# Patient Record
Sex: FEMALE | Race: WHITE | NOT HISPANIC OR LATINO | Employment: FULL TIME | URBAN - METROPOLITAN AREA
[De-identification: names, ages, dates, MRNs, and addresses within clinical notes are randomized per-mention and may not be internally consistent; named-entity substitution may affect disease eponyms.]

---

## 2023-07-28 ENCOUNTER — OFFICE VISIT (OUTPATIENT)
Dept: PRIMARY CARE | Facility: CLINIC | Age: 33
End: 2023-07-28
Payer: MEDICAID

## 2023-07-28 VITALS
TEMPERATURE: 97.6 F | OXYGEN SATURATION: 100 % | SYSTOLIC BLOOD PRESSURE: 112 MMHG | BODY MASS INDEX: 22.13 KG/M2 | WEIGHT: 121 LBS | HEART RATE: 80 BPM | DIASTOLIC BLOOD PRESSURE: 70 MMHG

## 2023-07-28 DIAGNOSIS — K31.84 GASTROPARESIS: Primary | ICD-10-CM

## 2023-07-28 DIAGNOSIS — F41.9 ANXIETY: ICD-10-CM

## 2023-07-28 DIAGNOSIS — H69.93 EUSTACHIAN TUBE DYSFUNCTION, BILATERAL: ICD-10-CM

## 2023-07-28 DIAGNOSIS — R11.0 NAUSEA: ICD-10-CM

## 2023-07-28 PROBLEM — F31.9 BIPOLAR DEPRESSION (MULTI): Status: ACTIVE | Noted: 2023-07-28

## 2023-07-28 PROBLEM — K59.09 CONSTIPATION, CHRONIC: Status: ACTIVE | Noted: 2023-07-28

## 2023-07-28 PROBLEM — R46.81 OBSESSIVE-COMPULSIVE BEHAVIOR: Status: ACTIVE | Noted: 2023-07-28

## 2023-07-28 PROBLEM — F12.90 MARIJUANA USE: Status: ACTIVE | Noted: 2023-07-28

## 2023-07-28 PROBLEM — F43.10 PTSD (POST-TRAUMATIC STRESS DISORDER): Status: ACTIVE | Noted: 2023-07-28

## 2023-07-28 PROBLEM — R45.4 EXCESSIVE ANGER: Status: ACTIVE | Noted: 2023-07-28

## 2023-07-28 PROBLEM — R87.610 ASCUS WITH POSITIVE HIGH RISK HPV CERVICAL: Status: ACTIVE | Noted: 2023-07-28

## 2023-07-28 PROBLEM — R87.810 ASCUS WITH POSITIVE HIGH RISK HPV CERVICAL: Status: ACTIVE | Noted: 2023-07-28

## 2023-07-28 PROBLEM — R45.4 IRRITABILITY AND ANGER: Status: ACTIVE | Noted: 2023-07-28

## 2023-07-28 PROBLEM — R45.5 HOSTILE BEHAVIOR: Status: ACTIVE | Noted: 2023-07-28

## 2023-07-28 PROCEDURE — 99214 OFFICE O/P EST MOD 30 MIN: CPT | Performed by: FAMILY MEDICINE

## 2023-07-28 PROCEDURE — 1036F TOBACCO NON-USER: CPT | Performed by: FAMILY MEDICINE

## 2023-07-28 RX ORDER — NORELGESTROMIN AND ETHINYL ESTRADIOL 35; 150 UG/D; UG/D
1 PATCH TRANSDERMAL
COMMUNITY

## 2023-07-28 RX ORDER — FLUTICASONE PROPIONATE 50 MCG
1 SPRAY, SUSPENSION (ML) NASAL DAILY
Qty: 16 G | Refills: 5 | Status: SHIPPED | OUTPATIENT
Start: 2023-07-28 | End: 2024-07-27

## 2023-07-28 ASSESSMENT — ENCOUNTER SYMPTOMS
VOMITING: 0
TREMORS: 0
DYSURIA: 0
ABDOMINAL PAIN: 0
NAUSEA: 0
COUGH: 0
DIZZINESS: 0
CHILLS: 0
DIARRHEA: 0
WEAKNESS: 0
SHORTNESS OF BREATH: 0
FEVER: 0

## 2023-07-28 NOTE — PROGRESS NOTES
"Subjective   Patient ID: Ale Tucker is a 33 y.o. female who presents for jittery (After eating, never thirsty x \"a while\"   Discuss DM testing).    Ale presents to discuss several concerns.     She is worried she may have diabetes because she always gets jittery. Sometimes eating helps, but sometimes it doesn't. Feels shaky inside. Symptoms p[resents daily.     Also has had flare of gastroparesis. Nauseated. One episode of vomiting.    Having pressure in both ears. No drainage. No change in hearing.             Review of Systems   Constitutional:  Negative for chills and fever.   Respiratory:  Negative for cough and shortness of breath.    Cardiovascular:  Negative for chest pain.   Gastrointestinal:  Negative for abdominal pain, diarrhea, nausea and vomiting.   Genitourinary:  Negative for dysuria.   Neurological:  Negative for dizziness, tremors, syncope and weakness.       Objective   /70   Pulse 80   Temp 36.4 °C (97.6 °F)   Wt 54.9 kg (121 lb)   SpO2 100%   BMI 22.13 kg/m²     Physical Exam  Constitutional:       General: She is not in acute distress.     Appearance: Normal appearance.   HENT:      Head: Normocephalic.      Nose: No congestion.      Mouth/Throat:      Mouth: Mucous membranes are moist.   Eyes:      Extraocular Movements: Extraocular movements intact.      Conjunctiva/sclera: Conjunctivae normal.   Cardiovascular:      Rate and Rhythm: Normal rate and regular rhythm.      Heart sounds: No murmur heard.  Pulmonary:      Effort: Pulmonary effort is normal.      Breath sounds: No wheezing or rhonchi.   Musculoskeletal:         General: No swelling or tenderness.      Cervical back: Neck supple.   Skin:     General: Skin is warm and dry.   Neurological:      General: No focal deficit present.      Mental Status: She is alert.   Psychiatric:         Mood and Affect: Mood normal.         Behavior: Behavior normal.         Assessment/Plan   Problem List Items Addressed This Visit  "      Anxiety    Gastroparesis - Primary    Relevant Orders    Comprehensive Metabolic Panel    CBC and Auto Differential     Other Visit Diagnoses       Nausea        Check labs. Plan GI referral if not improving.    Relevant Orders    Hemoglobin A1C    TSH with reflex to Free T4 if abnormal    Eustachian tube dysfunction, bilateral        Start Flonase    Relevant Medications    fluticasone (Flonase) 50 mcg/actuation nasal spray

## 2023-08-08 ENCOUNTER — TELEMEDICINE (OUTPATIENT)
Dept: PRIMARY CARE | Facility: CLINIC | Age: 33
End: 2023-08-08
Payer: MEDICAID

## 2023-08-08 DIAGNOSIS — J06.9 VIRAL UPPER RESPIRATORY TRACT INFECTION: Primary | ICD-10-CM

## 2023-08-08 PROCEDURE — 99214 OFFICE O/P EST MOD 30 MIN: CPT | Performed by: FAMILY MEDICINE

## 2023-08-08 RX ORDER — BENZONATATE 100 MG/1
100 CAPSULE ORAL 3 TIMES DAILY PRN
Qty: 30 CAPSULE | Refills: 0 | Status: SHIPPED | OUTPATIENT
Start: 2023-08-08 | End: 2023-09-07

## 2023-08-08 ASSESSMENT — ENCOUNTER SYMPTOMS
FATIGUE: 1
NAUSEA: 0
VOMITING: 0
SHORTNESS OF BREATH: 0
FEVER: 0
RHINORRHEA: 1
SORE THROAT: 0
COUGH: 1
CHILLS: 1
WHEEZING: 0

## 2023-08-08 NOTE — PROGRESS NOTES
Subjective   Patient ID: Sussy Tucker is a 33 y.o. female who presents for No chief complaint on file..    Visit being conducted via telemedicine.  Patient consents to telemedicine appointment and acknowledges its limitations.    Physician location: [facility]  Patient location: parked car  Length of telemedicine visit: 5:42 minutes      Sussy has been sick since Sunday. Developed cough and congestion. No fevers but felt warm. No ear pain or sore throat. Nose congested but also running. Coughing. No wheezing or shortness of breath. Using Flonase which helps somewhat. Wants a Z-pack prescription. States that she knows this is not COVID.     hroat,          Review of Systems   Constitutional:  Positive for chills and fatigue. Negative for fever.   HENT:  Positive for congestion and rhinorrhea. Negative for ear pain and sore throat.    Respiratory:  Positive for cough. Negative for shortness of breath and wheezing.    Gastrointestinal:  Negative for nausea and vomiting.       Objective   There were no vitals taken for this visit.    Physical Exam  Constitutional:       General: She is not in acute distress.  HENT:      Head: Normocephalic and atraumatic.   Eyes:      Extraocular Movements: Extraocular movements intact.   Pulmonary:      Effort: Pulmonary effort is normal.   Neurological:      Mental Status: She is alert.   Psychiatric:         Mood and Affect: Mood normal.         Assessment/Plan   Diagnoses and all orders for this visit:  Viral upper respiratory tract infection  Comments:  Likely viral infection. Discussed that antibiotics not indicated since only having symptoms for 3 days. Start benzonatate. Call if not improving.  Orders:  -     benzonatate (Tessalon) 100 mg capsule; Take 1 capsule (100 mg) by mouth 3 times a day as needed for cough. Do not crush or chew.

## 2023-09-12 ENCOUNTER — HOSPITAL ENCOUNTER (OUTPATIENT)
Dept: DATA CONVERSION | Facility: HOSPITAL | Age: 33
End: 2023-09-12
Attending: OBSTETRICS & GYNECOLOGY | Admitting: OBSTETRICS & GYNECOLOGY
Payer: MEDICAID

## 2023-09-12 DIAGNOSIS — Z30.2 ENCOUNTER FOR STERILIZATION: ICD-10-CM

## 2023-09-12 DIAGNOSIS — D06.9 CARCINOMA IN SITU OF CERVIX, UNSPECIFIED: ICD-10-CM

## 2023-09-14 LAB — HCG, URINE: NEGATIVE

## 2023-09-20 LAB
COMPLETE PATHOLOGY REPORT: NORMAL
CONVERTED CLINICAL DIAGNOSIS-HISTORY: NORMAL
CONVERTED FINAL DIAGNOSIS: NORMAL
CONVERTED FINAL REPORT PDF LINK TO COPY AND PASTE: NORMAL
CONVERTED GROSS DESCRIPTION: NORMAL

## 2023-09-29 VITALS — HEIGHT: 61 IN | WEIGHT: 117.95 LBS | BODY MASS INDEX: 22.27 KG/M2

## 2023-09-30 NOTE — H&P
History & Physical Reviewed:   Pregnant/Lactating:  ·  Are You Pregnant no   ·  Are You Currently Breastfeeding no     I have reviewed the History and Physical dated:  28-Aug-2023   History and Physical reviewed and relevant findings noted. Patient examined to review pertinent physical  findings.: No significant changes   Home Medications Reviewed: no changes noted   Allergies Reviewed: no changes noted       ERAS (Enhanced Recovery After Surgery):  ·  ERAS Patient: yes   ·  CPM/PAT Utilization: yes   ·  Immunonutrition Recovery Drink Utilization: no   ·  Carbohydrate Supplement Drink Utilization: no     Consent:   COVID-19 Consent:  ·  COVID-19 Risk Consent Surgeon has reviewed key risks related to the risk of trini COVID-19 and if they contract COVID-19 what the risks are.       Electronic Signatures:  Jai Hernandez)  (Signed 12-Sep-2023 08:20)   Authored: History & Physical Reviewed, ERAS, Consent,  Note Completion      Last Updated: 12-Sep-2023 08:20 by Jai Hernandez)

## 2023-10-01 NOTE — OP NOTE
PROCEDURE DETAILS    Preoperative Diagnosis:  Request for sterilization, Z30.2  ASC H on cervical Pap    Postoperative Diagnosis:  Request for sterilization, Z30.2  ASC H on cervical Pap    Surgeon: Jai Hernandez  Resident/Fellow/Other Assistant: Nica Love    Procedure:  1. LAPAROSCOPIC BILATERAL SALPINGECTOMY with Colposcopy and Loop electrosurgical excision    2.  LEEP cone biopsy    Anesthesia: No anesthesiologist associated with this case  Estimated Blood Loss: 0  Findings: Transformation zone visualized, acetowhite on colposcopy  On laparoscopy normal uterus ovaries and tubes  Specimens(s) Collected: yes,  Both tubes  Cervical cone biopsy     Urine Output: 10 mL        Operative Report:   33-year-old female desires permanent sterilization.  Patient with prior history of HEIDI-1, current Pap with ASC H.  Patient poorly tolerant of pelvic exam in  the office and declines office colposcopy elected to proceed with a LEEP cone biopsy.     Patient brought to the operating room and a huddle was performed, she was placed under general ET tube anesthesia.  She was placed in lithotomy position.  A timeout was performed.  An ionized speculum was inserted in the vagina, her cervix was visualized,  acetic acid was applied, and a composite was performed.  Lugol solution was then applied.  Cervix was injected with 10 mL of 1% lidocaine with epinephrine circumferentially around the cervix.  A LEEP cone biopsy was then performed with 2 specimens.  Hemostasis  was noted be good.  Cervix was cauterized and Monsel solution was applied.    She was then prepped and draped in the usual fashion for both an abdominal and a vaginal procedure. A weighted speculum was placed in the vagina.  Her bladder was emptied with a straight catheter. Her cervix was grasped with a tooth tenaculum. A Hulka clamp was inserted into the uterus and the tooth tenaculum was removed. The weighted speculum was removed. Attention was then turned  abdominally.  An intraumbilical incision was cut sharply in the skin. A Veress needle was inserted into the abdominal cavity. Its position was confirmed with saline. CO2 was instilled into the abdominal cavity. The Veress needle was removed. Under direct vision a 5  mm trocar and sheath was inserted. The trocar was removed and the laparoscope was inserted. A 8 mm left lateral incision was cut sharply in the skin and through this a second trocar and sheath was inserted. And a 5 mm right lateral incision was cut sharply  in the skin and through this a third trocar and sheath was inserted. The pelvis was inspected.  Normal uterus ovaries and tubes.  The left salpinx was grasped and using the ligature device the mesosalpinx was incised throughout the length of the salpinx.  Then the salpinx was transected proximally, thus excising the left salpinx. The same procedure was performed on the right tube, first grasping the tube then incising the mesosalpinx throughout the length of the salpinx, and transecting the salpinx proximally  thus excising the right salpinx.  Hemostasis was noted to be good. All instruments were removed. The incisions were repaired with 4-0 Vicryl. All counts were correct. Estimated blood loss was 0 ml.  Patient was taken to the recovery room in stable condition.                        Attestation:   Note Completion:  Attending Attestation I performed the procedure without a resident         Electronic Signatures:  Jai Hernandez)  (Signed 12-Sep-2023 10:12)   Authored: Post-Operative Note, Chart Review, Note Completion      Last Updated: 12-Sep-2023 10:12 by Jai Hernandez)

## 2024-05-08 PROBLEM — D06.9 CIN III (CERVICAL INTRAEPITHELIAL NEOPLASIA III): Status: ACTIVE | Noted: 2024-05-08

## 2024-05-15 ENCOUNTER — APPOINTMENT (OUTPATIENT)
Dept: OBSTETRICS AND GYNECOLOGY | Facility: CLINIC | Age: 34
End: 2024-05-15
Payer: MEDICAID

## 2024-10-28 ENCOUNTER — APPOINTMENT (OUTPATIENT)
Dept: OBSTETRICS AND GYNECOLOGY | Facility: CLINIC | Age: 34
End: 2024-10-28
Payer: MEDICAID

## 2024-10-28 VITALS — SYSTOLIC BLOOD PRESSURE: 104 MMHG | DIASTOLIC BLOOD PRESSURE: 80 MMHG | BODY MASS INDEX: 19.02 KG/M2 | WEIGHT: 104 LBS

## 2024-10-28 DIAGNOSIS — Z12.4 SCREENING FOR MALIGNANT NEOPLASM OF CERVIX: ICD-10-CM

## 2024-10-28 DIAGNOSIS — Z11.3 SCREEN FOR STD (SEXUALLY TRANSMITTED DISEASE): ICD-10-CM

## 2024-10-28 DIAGNOSIS — Z80.3 FAMILY HISTORY OF BREAST CANCER: ICD-10-CM

## 2024-10-28 DIAGNOSIS — Z11.51 SCREENING FOR HPV (HUMAN PAPILLOMAVIRUS): ICD-10-CM

## 2024-10-28 DIAGNOSIS — D06.9 CIN III (CERVICAL INTRAEPITHELIAL NEOPLASIA III): Primary | ICD-10-CM

## 2024-10-28 PROCEDURE — 87591 N.GONORRHOEAE DNA AMP PROB: CPT

## 2024-10-28 PROCEDURE — 87624 HPV HI-RISK TYP POOLED RSLT: CPT

## 2024-10-28 PROCEDURE — 1036F TOBACCO NON-USER: CPT | Performed by: STUDENT IN AN ORGANIZED HEALTH CARE EDUCATION/TRAINING PROGRAM

## 2024-10-28 PROCEDURE — 87661 TRICHOMONAS VAGINALIS AMPLIF: CPT

## 2024-10-28 PROCEDURE — 87491 CHLMYD TRACH DNA AMP PROBE: CPT

## 2024-10-28 PROCEDURE — 99213 OFFICE O/P EST LOW 20 MIN: CPT | Performed by: STUDENT IN AN ORGANIZED HEALTH CARE EDUCATION/TRAINING PROGRAM

## 2024-10-29 LAB
C TRACH RRNA SPEC QL NAA+PROBE: NEGATIVE
N GONORRHOEA DNA SPEC QL PROBE+SIG AMP: NEGATIVE
T VAGINALIS RRNA SPEC QL NAA+PROBE: NEGATIVE

## 2024-11-07 LAB
CYTOLOGY CMNT CVX/VAG CYTO-IMP: NORMAL
HPV HR 12 DNA GENITAL QL NAA+PROBE: POSITIVE
HPV HR GENOTYPES PNL CVX NAA+PROBE: POSITIVE
HPV16 DNA SPEC QL NAA+PROBE: NEGATIVE
HPV18 DNA SPEC QL NAA+PROBE: POSITIVE
LAB AP HPV GENOTYPE QUESTION: YES
LAB AP HPV HR: NORMAL
LAB AP PAP ADDITIONAL TESTS: NORMAL
LABORATORY COMMENT REPORT: NORMAL
LABORATORY COMMENT REPORT: NORMAL
LMP START DATE: NORMAL
PATH REPORT.TOTAL CANCER: NORMAL

## 2024-11-26 ENCOUNTER — APPOINTMENT (OUTPATIENT)
Dept: OBSTETRICS AND GYNECOLOGY | Facility: CLINIC | Age: 34
End: 2024-11-26
Payer: MEDICAID

## 2024-11-26 DIAGNOSIS — R87.610 PAP SMEAR ABNORMALITY OF CERVIX WITH ASCUS FAVORING DYSPLASIA: Primary | ICD-10-CM

## 2024-11-26 DIAGNOSIS — R87.610 ASCUS WITH POSITIVE HIGH RISK HPV CERVICAL: ICD-10-CM

## 2024-11-26 DIAGNOSIS — R87.810 ASCUS WITH POSITIVE HIGH RISK HPV CERVICAL: ICD-10-CM

## 2024-11-26 DIAGNOSIS — Z80.3 FAMILY HISTORY OF BREAST CANCER: ICD-10-CM

## 2024-12-05 PROBLEM — R87.610 PAP SMEAR ABNORMALITY OF CERVIX WITH ASCUS FAVORING DYSPLASIA: Status: ACTIVE | Noted: 2024-11-26

## 2024-12-05 NOTE — PROGRESS NOTES
"Subjective   Patient ID: Ale Tucker \"Roshni" is a 34 y.o. female who presents for Follow up .  Patient presents for follow up of pap smear. She has no current symptoms. No vaginal bleeding. Not currently sexually active. No night sweats, fevers chills, unexplained weight loss. She is over all in good health.         Review of Systems   All other systems reviewed and are negative.      Objective   Physical Exam  Telehealth visit.    Assessment/Plan   Problem List Items Addressed This Visit       ASCUS with positive high risk HPV cervical    Overview     Cannot exclude high grade. She meets criteria for expedited treatment. Will schedule for cold knife cone. RBA discussed including but not limited to: Infection, bleeding, injury to adjacent structures such as bladder and bowel, need for admission or a larger incision.           Family history of breast cancer    Overview     Mother recently diagnosed. She has a strong family hx of cancer.  Discussed genetic testing, patient would like to complete this. It has been ordered.    Paternal and Maternal Grandmother: Breast cancer. Unsure of age.  Maternal Grandmother: Gastric cancer  Paternal Grandfather: Skin cancer.  Grandfather: colon cancer, pancreatic cancer.          Other Visit Diagnoses       Pap smear abnormality of cervix with ASCUS favoring dysplasia    -  Primary    Relevant Orders    Case Request Operating Room: Excision Lesion Cervix (Completed)                Virtual or Telephone Consent    An interactive audio and video telecommunication system which permits real time communications between the patient (at the originating site) and provider (at the distant site) was utilized to provide this telehealth service.   Verbal consent was requested and obtained from Ale Tucker on this date for a telehealth visit.     Taran Hutchins MD 12/04/24 10:38 PM   "

## 2025-01-12 ENCOUNTER — ANESTHESIA EVENT (OUTPATIENT)
Dept: OPERATING ROOM | Facility: HOSPITAL | Age: 35
End: 2025-01-12
Payer: MEDICAID

## 2025-01-17 ENCOUNTER — ANESTHESIA (OUTPATIENT)
Dept: OPERATING ROOM | Facility: HOSPITAL | Age: 35
End: 2025-01-17
Payer: MEDICAID

## 2025-01-17 ENCOUNTER — PHARMACY VISIT (OUTPATIENT)
Dept: PHARMACY | Facility: CLINIC | Age: 35
End: 2025-01-17
Payer: MEDICAID

## 2025-01-17 ENCOUNTER — HOSPITAL ENCOUNTER (OUTPATIENT)
Facility: HOSPITAL | Age: 35
Setting detail: OUTPATIENT SURGERY
Discharge: HOME | End: 2025-01-17
Attending: STUDENT IN AN ORGANIZED HEALTH CARE EDUCATION/TRAINING PROGRAM | Admitting: STUDENT IN AN ORGANIZED HEALTH CARE EDUCATION/TRAINING PROGRAM
Payer: MEDICAID

## 2025-01-17 VITALS
OXYGEN SATURATION: 100 % | SYSTOLIC BLOOD PRESSURE: 113 MMHG | HEART RATE: 82 BPM | TEMPERATURE: 97.8 F | DIASTOLIC BLOOD PRESSURE: 70 MMHG | RESPIRATION RATE: 14 BRPM

## 2025-01-17 DIAGNOSIS — R87.610 PAP SMEAR ABNORMALITY OF CERVIX WITH ASCUS FAVORING DYSPLASIA: ICD-10-CM

## 2025-01-17 DIAGNOSIS — G89.18 POST-OP PAIN: Primary | ICD-10-CM

## 2025-01-17 LAB — PREGNANCY TEST URINE, POC: NEGATIVE

## 2025-01-17 PROCEDURE — 7100000002 HC RECOVERY ROOM TIME - EACH INCREMENTAL 1 MINUTE: Performed by: STUDENT IN AN ORGANIZED HEALTH CARE EDUCATION/TRAINING PROGRAM

## 2025-01-17 PROCEDURE — 2500000004 HC RX 250 GENERAL PHARMACY W/ HCPCS (ALT 636 FOR OP/ED): Performed by: NURSE ANESTHETIST, CERTIFIED REGISTERED

## 2025-01-17 PROCEDURE — 2720000007 HC OR 272 NO HCPCS: Performed by: STUDENT IN AN ORGANIZED HEALTH CARE EDUCATION/TRAINING PROGRAM

## 2025-01-17 PROCEDURE — 2500000004 HC RX 250 GENERAL PHARMACY W/ HCPCS (ALT 636 FOR OP/ED): Performed by: ANESTHESIOLOGY

## 2025-01-17 PROCEDURE — 81025 URINE PREGNANCY TEST: CPT | Performed by: ANESTHESIOLOGY

## 2025-01-17 PROCEDURE — 7100000001 HC RECOVERY ROOM TIME - INITIAL BASE CHARGE: Performed by: STUDENT IN AN ORGANIZED HEALTH CARE EDUCATION/TRAINING PROGRAM

## 2025-01-17 PROCEDURE — 2500000005 HC RX 250 GENERAL PHARMACY W/O HCPCS: Performed by: STUDENT IN AN ORGANIZED HEALTH CARE EDUCATION/TRAINING PROGRAM

## 2025-01-17 PROCEDURE — 96372 THER/PROPH/DIAG INJ SC/IM: CPT | Performed by: NURSE ANESTHETIST, CERTIFIED REGISTERED

## 2025-01-17 PROCEDURE — 88342 IMHCHEM/IMCYTCHM 1ST ANTB: CPT | Performed by: PATHOLOGY

## 2025-01-17 PROCEDURE — 3600000003 HC OR TIME - INITIAL BASE CHARGE - PROCEDURE LEVEL THREE: Performed by: STUDENT IN AN ORGANIZED HEALTH CARE EDUCATION/TRAINING PROGRAM

## 2025-01-17 PROCEDURE — 7100000009 HC PHASE TWO TIME - INITIAL BASE CHARGE: Performed by: STUDENT IN AN ORGANIZED HEALTH CARE EDUCATION/TRAINING PROGRAM

## 2025-01-17 PROCEDURE — 2500000004 HC RX 250 GENERAL PHARMACY W/ HCPCS (ALT 636 FOR OP/ED): Performed by: STUDENT IN AN ORGANIZED HEALTH CARE EDUCATION/TRAINING PROGRAM

## 2025-01-17 PROCEDURE — RXMED WILLOW AMBULATORY MEDICATION CHARGE

## 2025-01-17 PROCEDURE — 88307 TISSUE EXAM BY PATHOLOGIST: CPT | Performed by: PATHOLOGY

## 2025-01-17 PROCEDURE — 88305 TISSUE EXAM BY PATHOLOGIST: CPT | Mod: TC,PORLAB | Performed by: STUDENT IN AN ORGANIZED HEALTH CARE EDUCATION/TRAINING PROGRAM

## 2025-01-17 PROCEDURE — 3600000008 HC OR TIME - EACH INCREMENTAL 1 MINUTE - PROCEDURE LEVEL THREE: Performed by: STUDENT IN AN ORGANIZED HEALTH CARE EDUCATION/TRAINING PROGRAM

## 2025-01-17 PROCEDURE — 7100000010 HC PHASE TWO TIME - EACH INCREMENTAL 1 MINUTE: Performed by: STUDENT IN AN ORGANIZED HEALTH CARE EDUCATION/TRAINING PROGRAM

## 2025-01-17 PROCEDURE — 3700000001 HC GENERAL ANESTHESIA TIME - INITIAL BASE CHARGE: Performed by: STUDENT IN AN ORGANIZED HEALTH CARE EDUCATION/TRAINING PROGRAM

## 2025-01-17 PROCEDURE — 88305 TISSUE EXAM BY PATHOLOGIST: CPT | Performed by: PATHOLOGY

## 2025-01-17 PROCEDURE — 57520 CONIZATION OF CERVIX: CPT | Performed by: STUDENT IN AN ORGANIZED HEALTH CARE EDUCATION/TRAINING PROGRAM

## 2025-01-17 PROCEDURE — 3700000002 HC GENERAL ANESTHESIA TIME - EACH INCREMENTAL 1 MINUTE: Performed by: STUDENT IN AN ORGANIZED HEALTH CARE EDUCATION/TRAINING PROGRAM

## 2025-01-17 RX ORDER — LIDOCAINE HYDROCHLORIDE 10 MG/ML
0.1 INJECTION, SOLUTION EPIDURAL; INFILTRATION; INTRACAUDAL; PERINEURAL ONCE
Status: DISCONTINUED | OUTPATIENT
Start: 2025-01-17 | End: 2025-01-17 | Stop reason: HOSPADM

## 2025-01-17 RX ORDER — OXYCODONE HYDROCHLORIDE 5 MG/1
5 TABLET ORAL EVERY 6 HOURS PRN
Qty: 12 TABLET | Refills: 0 | Status: SHIPPED | OUTPATIENT
Start: 2025-01-17 | End: 2025-01-20

## 2025-01-17 RX ORDER — HYDRALAZINE HYDROCHLORIDE 20 MG/ML
5 INJECTION INTRAMUSCULAR; INTRAVENOUS EVERY 30 MIN PRN
Status: DISCONTINUED | OUTPATIENT
Start: 2025-01-17 | End: 2025-01-17 | Stop reason: HOSPADM

## 2025-01-17 RX ORDER — DIPHENHYDRAMINE HYDROCHLORIDE 50 MG/ML
12.5 INJECTION INTRAMUSCULAR; INTRAVENOUS ONCE AS NEEDED
Status: DISCONTINUED | OUTPATIENT
Start: 2025-01-17 | End: 2025-01-17 | Stop reason: HOSPADM

## 2025-01-17 RX ORDER — LIDOCAINE HCL/PF 100 MG/5ML
SYRINGE (ML) INTRAVENOUS AS NEEDED
Status: DISCONTINUED | OUTPATIENT
Start: 2025-01-17 | End: 2025-01-17

## 2025-01-17 RX ORDER — SODIUM CHLORIDE, SODIUM LACTATE, POTASSIUM CHLORIDE, CALCIUM CHLORIDE 600; 310; 30; 20 MG/100ML; MG/100ML; MG/100ML; MG/100ML
100 INJECTION, SOLUTION INTRAVENOUS CONTINUOUS
Status: DISCONTINUED | OUTPATIENT
Start: 2025-01-17 | End: 2025-01-17 | Stop reason: HOSPADM

## 2025-01-17 RX ORDER — ONDANSETRON HYDROCHLORIDE 2 MG/ML
4 INJECTION, SOLUTION INTRAVENOUS ONCE AS NEEDED
Status: DISCONTINUED | OUTPATIENT
Start: 2025-01-17 | End: 2025-01-17 | Stop reason: HOSPADM

## 2025-01-17 RX ORDER — MORPHINE SULFATE 2 MG/ML
2 INJECTION, SOLUTION INTRAMUSCULAR; INTRAVENOUS EVERY 5 MIN PRN
Status: DISCONTINUED | OUTPATIENT
Start: 2025-01-17 | End: 2025-01-17 | Stop reason: HOSPADM

## 2025-01-17 RX ORDER — MIDAZOLAM HYDROCHLORIDE 1 MG/ML
INJECTION, SOLUTION INTRAMUSCULAR; INTRAVENOUS AS NEEDED
Status: DISCONTINUED | OUTPATIENT
Start: 2025-01-17 | End: 2025-01-17

## 2025-01-17 RX ORDER — FAMOTIDINE 10 MG/ML
20 INJECTION INTRAVENOUS ONCE
Status: COMPLETED | OUTPATIENT
Start: 2025-01-17 | End: 2025-01-17

## 2025-01-17 RX ORDER — MEPERIDINE HYDROCHLORIDE 25 MG/ML
12.5 INJECTION INTRAMUSCULAR; INTRAVENOUS; SUBCUTANEOUS EVERY 10 MIN PRN
Status: DISCONTINUED | OUTPATIENT
Start: 2025-01-17 | End: 2025-01-17 | Stop reason: HOSPADM

## 2025-01-17 RX ORDER — IBUPROFEN 600 MG/1
600 TABLET ORAL EVERY 6 HOURS PRN
Qty: 30 TABLET | Refills: 1 | Status: SHIPPED | OUTPATIENT
Start: 2025-01-17

## 2025-01-17 RX ORDER — PROPOFOL 10 MG/ML
INJECTION, EMULSION INTRAVENOUS AS NEEDED
Status: DISCONTINUED | OUTPATIENT
Start: 2025-01-17 | End: 2025-01-17

## 2025-01-17 RX ORDER — DROPERIDOL 2.5 MG/ML
0.62 INJECTION, SOLUTION INTRAMUSCULAR; INTRAVENOUS ONCE AS NEEDED
Status: DISCONTINUED | OUTPATIENT
Start: 2025-01-17 | End: 2025-01-17 | Stop reason: HOSPADM

## 2025-01-17 RX ORDER — AMOXICILLIN 250 MG
1 CAPSULE ORAL NIGHTLY PRN
Qty: 14 TABLET | Refills: 1 | Status: SHIPPED | OUTPATIENT
Start: 2025-01-17

## 2025-01-17 RX ORDER — LABETALOL HYDROCHLORIDE 5 MG/ML
5 INJECTION, SOLUTION INTRAVENOUS ONCE AS NEEDED
Status: DISCONTINUED | OUTPATIENT
Start: 2025-01-17 | End: 2025-01-17 | Stop reason: HOSPADM

## 2025-01-17 RX ORDER — ONDANSETRON 4 MG/1
4 TABLET, ORALLY DISINTEGRATING ORAL EVERY 8 HOURS PRN
Qty: 40 TABLET | Refills: 1 | Status: SHIPPED | OUTPATIENT
Start: 2025-01-17

## 2025-01-17 RX ORDER — SIMETHICONE 80 MG
80 TABLET,CHEWABLE ORAL EVERY 6 HOURS PRN
Qty: 30 TABLET | Refills: 1 | Status: SHIPPED | OUTPATIENT
Start: 2025-01-17

## 2025-01-17 RX ORDER — LIDOCAINE HYDROCHLORIDE AND EPINEPHRINE 10; 10 UG/ML; MG/ML
INJECTION, SOLUTION INFILTRATION; PERINEURAL AS NEEDED
Status: DISCONTINUED | OUTPATIENT
Start: 2025-01-17 | End: 2025-01-17 | Stop reason: HOSPADM

## 2025-01-17 RX ORDER — ONDANSETRON HYDROCHLORIDE 2 MG/ML
INJECTION, SOLUTION INTRAVENOUS AS NEEDED
Status: DISCONTINUED | OUTPATIENT
Start: 2025-01-17 | End: 2025-01-17

## 2025-01-17 RX ORDER — KETOROLAC TROMETHAMINE 30 MG/ML
INJECTION, SOLUTION INTRAMUSCULAR; INTRAVENOUS AS NEEDED
Status: DISCONTINUED | OUTPATIENT
Start: 2025-01-17 | End: 2025-01-17

## 2025-01-17 RX ORDER — FENTANYL CITRATE 50 UG/ML
INJECTION, SOLUTION INTRAMUSCULAR; INTRAVENOUS AS NEEDED
Status: DISCONTINUED | OUTPATIENT
Start: 2025-01-17 | End: 2025-01-17

## 2025-01-17 RX ORDER — ALBUTEROL SULFATE 0.83 MG/ML
2.5 SOLUTION RESPIRATORY (INHALATION) ONCE AS NEEDED
Status: DISCONTINUED | OUTPATIENT
Start: 2025-01-17 | End: 2025-01-17 | Stop reason: HOSPADM

## 2025-01-17 RX ADMIN — FENTANYL CITRATE 100 MCG: 50 INJECTION INTRAMUSCULAR; INTRAVENOUS at 14:28

## 2025-01-17 RX ADMIN — KETOROLAC TROMETHAMINE 30 MG: 30 INJECTION, SOLUTION INTRAMUSCULAR at 14:59

## 2025-01-17 RX ADMIN — DEXAMETHASONE SODIUM PHOSPHATE 8 MG: 4 INJECTION INTRA-ARTICULAR; INTRALESIONAL; INTRAMUSCULAR; INTRAVENOUS; SOFT TISSUE at 14:28

## 2025-01-17 RX ADMIN — PROPOFOL 200 MG: 10 INJECTION, EMULSION INTRAVENOUS at 14:28

## 2025-01-17 RX ADMIN — SODIUM CHLORIDE: 9 INJECTION, SOLUTION INTRAVENOUS at 14:23

## 2025-01-17 RX ADMIN — LIDOCAINE HYDROCHLORIDE 50 MG: 20 INJECTION, SOLUTION INTRAVENOUS at 14:28

## 2025-01-17 RX ADMIN — ONDANSETRON 4 MG: 2 INJECTION INTRAMUSCULAR; INTRAVENOUS at 14:28

## 2025-01-17 RX ADMIN — FAMOTIDINE 20 MG: 10 INJECTION, SOLUTION INTRAVENOUS at 13:06

## 2025-01-17 RX ADMIN — MIDAZOLAM 2 MG: 1 INJECTION INTRAMUSCULAR; INTRAVENOUS at 14:28

## 2025-01-17 SDOH — HEALTH STABILITY: MENTAL HEALTH: CURRENT SMOKER: 0

## 2025-01-17 ASSESSMENT — COLUMBIA-SUICIDE SEVERITY RATING SCALE - C-SSRS
1. IN THE PAST MONTH, HAVE YOU WISHED YOU WERE DEAD OR WISHED YOU COULD GO TO SLEEP AND NOT WAKE UP?: NO
6. HAVE YOU EVER DONE ANYTHING, STARTED TO DO ANYTHING, OR PREPARED TO DO ANYTHING TO END YOUR LIFE?: NO
2. HAVE YOU ACTUALLY HAD ANY THOUGHTS OF KILLING YOURSELF?: NO

## 2025-01-17 ASSESSMENT — PAIN DESCRIPTION - DESCRIPTORS
DESCRIPTORS: BURNING
DESCRIPTORS: BURNING

## 2025-01-17 ASSESSMENT — PAIN SCALES - GENERAL
PAINLEVEL_OUTOF10: 3
PAINLEVEL_OUTOF10: 5 - MODERATE PAIN
PAIN_LEVEL: 0
PAINLEVEL_OUTOF10: 0 - NO PAIN
PAINLEVEL_OUTOF10: 4
PAINLEVEL_OUTOF10: 0 - NO PAIN

## 2025-01-17 ASSESSMENT — PAIN - FUNCTIONAL ASSESSMENT
PAIN_FUNCTIONAL_ASSESSMENT: 0-10
PAIN_FUNCTIONAL_ASSESSMENT: 0-10
PAIN_FUNCTIONAL_ASSESSMENT: FLACC (FACE, LEGS, ACTIVITY, CRY, CONSOLABILITY)
PAIN_FUNCTIONAL_ASSESSMENT: 0-10

## 2025-01-17 NOTE — DISCHARGE INSTRUCTIONS
Call your provider if you experience:  Notify provider for bad-smelling vaginal blood or discharge  Notify provider for fever or chills  Notify provider for heavy bleeding  Soaking 2 large pads every hour or passing large clots.  Notify provider for pain, burning or difficulty with emptying your bladder  Notify provider for red or swollen leg that is painful or warm to touch     Do not lift >30 lbs for 2 weeks. No tub baths for 4 weeks. No intercourse for 4 weeks.

## 2025-01-17 NOTE — ASSESSMENT & PLAN NOTE
Cannot exclude high grade. She meets criteria for expedited treatment. Will schedule for cold knife cone. RBA discussed including but not limited to: Infection, bleeding, injury to adjacent structures such as bladder and bowel, need for admission or a larger incision.

## 2025-01-17 NOTE — ANESTHESIA PREPROCEDURE EVALUATION
"Patient: Ale Tucker \"Sussy\"    Procedure Information       Date/Time: 01/17/25 1340    Procedure: Cold knife cone (Pelvis) - Cold knife cone.    Location: [facility] OR 06 / Virtual [facility] OR    Surgeons: Taran Hutchins MD            Relevant Problems   Neuro   (+) Anxiety   (+) Bipolar depression (Multi)   (+) PTSD (post-traumatic stress disorder)       Clinical information reviewed:   Tobacco  Allergies  Meds   Med Hx  Surg Hx  OB Status  Fam Hx  Soc   Hx        NPO Detail:  NPO/Void Status  Date of Last Liquid: 01/17/25  Time of Last Liquid: 1100  Date of Last Solid: 01/16/25  Time of Last Solid: 2350         Physical Exam    Airway  Mallampati: II  TM distance: >3 FB  Neck ROM: full     Cardiovascular - normal exam     Dental - normal exam       Pulmonary - normal exam     Abdominal - normal exam       Other findings: DENTAL RISKS DISCUSSED. FRONT TEETH CROWNS ARE DISCOLORED AND DARK AT ROOTS.          Anesthesia Plan    History of general anesthesia?: yes  History of complications of general anesthesia?: no    ASA 2     general     The patient is not a current smoker.    intravenous induction   Anesthetic plan and risks discussed with patient.  Use of blood products discussed with patient who consented to blood products.    Plan discussed with CRNA.      "

## 2025-01-17 NOTE — ANESTHESIA POSTPROCEDURE EVALUATION
"Patient: Ale Tucker \"Sussy\"    Procedure Summary       Date: 01/17/25 Room / Location: [facility] 06 / [facility]    Anesthesia Start: 1423 Anesthesia Stop: 1526    Procedure: Cold knife cone (Pelvis) Diagnosis:       Pap smear abnormality of cervix with ASCUS favoring dysplasia      (Pap smear abnormality of cervix with ASCUS favoring dysplasia [R87.610])    Surgeons: Taran Hutchins MD Responsible Provider: LAUREN Penny    Anesthesia Type: general ASA Status: 2            Anesthesia Type: general    Vitals Value Taken Time   BP 84/54 01/17/25 1530   Temp 97.8 01/17/25 1530   Pulse 57 01/17/25 1530   Resp 13 01/17/25 1530   SpO2 100 01/17/25 1530       Anesthesia Post Evaluation    Patient location during evaluation: PACU  Patient participation: waiting for patient participation  Level of consciousness: awake  Pain score: 0  Pain management: adequate  Airway patency: patent  Cardiovascular status: acceptable  Respiratory status: acceptable  Hydration status: acceptable  Postoperative Nausea and Vomiting: none        There were no known notable events for this encounter.    "

## 2025-01-17 NOTE — OP NOTE
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 [R87.610]     Procedures  Cold knife cone  07204 - AK CONIZATION CERVIX W/WO D&C RPR KNIFE/LASER      Surgeons      * Taran Hutchins - Primary    Resident/Fellow/Other Assistant:  Surgeons and Role:  * No surgeons found with a matching role *    Staff:   Circulator: Janessa Archerub Person: Lisette  Surgical Assistant: Bernie Archerub Person: Angelique Oviedo Circulator: Daphnie    Anesthesia Staff: CRNA: BENY Penny-CRNA    Procedure Summary  Anesthesia: General  ASA: II  Estimated Blood Loss: 5 mL  Intra-op Medications:   Administrations occurring from 1340 to 1440 on 25:   Medication Name Total Dose   dexAMETHasone (Decadron) injection 4 mg/mL 8 mg   fentaNYL (Sublimaze) injection 50 mcg/mL 100 mcg   lidocaine (cardiac) injection 2% prefilled syringe 50 mg   midazolam (Versed) injection 1 mg/mL 2 mg   ondansetron (Zofran) 2 mg/mL injection 4 mg   propofol (Diprivan) injection 10 mg/mL 200 mg   NaCl 0.9 % bolus Cannot be calculated              Anesthesia Record               Intraprocedure I/O Totals          Intake    NaCl 0.9 % bolus 250.00 mL    Total Intake 250 mL          Specimen:   ID Type Source Tests Collected by Time   1 : ENDOCERVICAL CURETTINGS Tissue ENDOCERVIX CURETTINGS SURGICAL PATHOLOGY EXAM Taran Hutchins MD 2025 1506   2 : CERVIX BIOPSY COLD CONE SUTURE AT 12:00 Tissue ENDOCERVIX CURETTINGS SURGICAL PATHOLOGY EXAM Taran Hutchins MD 2025 1507   3 : ENDOCERVICAL BIOPSY Tissue ENDOCERVIX CURETTINGS SURGICAL PATHOLOGY EXAM Taran Hutchins MD 2025 1510                 Drains and/or Catheters: * None in log " "*    Tourniquet Times:         Implants:     Findings: Cervix with scarring consistent with prior LEEP. Lugol's shows hypopigmentation immediately adjacent to the endo cervix in a circumferentially approximately 3 mm from the cervical os.    Indications: Ale Tucker \"Roshni" is an 34 y.o. female who is having surgery for Pap smear abnormality of cervix with ASCUS favoring dysplasia [R87.610].     The patient was seen in the preoperative area. The risks, benefits, complications, treatment options, non-operative alternatives, expected recovery and outcomes were discussed with the patient. The possibilities of reaction to medication, pulmonary aspiration, injury to surrounding structures, bleeding, recurrent infection, the need for additional procedures, failure to diagnose a condition, and creating a complication requiring transfusion or operation were discussed with the patient. The patient concurred with the proposed plan, giving informed consent.  The site of surgery was properly noted/marked if necessary per policy. The patient has been actively warmed in preoperative area. Preoperative antibiotics are not indicated. Venous thrombosis prophylaxis have been ordered including bilateral sequential compression devices    The patient was taken back to the operating room. General anesthesia was administered and found to be adequate. The patient was prepped and draped in the usual sterile fashion with her legs in yellow fin stir-ups.    Sol retractors were used to visualize the cervix. The cervix was grasped with a single tooth tenaculum. 1% Lidocaine with epi was injected in the cervix with a paracervical block also being performed. Stay sutures were then placed at the 3 and 9 o clock positions. The cervix was cut using a scalpel circumferentially. Curved ragsdale scissors were then used to cut the endocervix. The specimen was marked at the 12 o clock position. An ECC was then performed. Ball cautery and Daphne's were " used to obtain excellent hemostasis.    All instruments were removed from the vagina. The patient tolerated the procedure well. Sponge/lap/instrument and needle counts were correct x2 at the end of the procedure. The patient was taken back to the recovery room in good condition.       Complications:  None; patient tolerated the procedure well.    Disposition: PACU - hemodynamically stable.  Condition: stable         Taran Hutchins  Phone Number: [phone]

## 2025-01-17 NOTE — H&P
"History Of Present Illness  Sussy Tucker is a 34 y.o. female presenting with ASCUS cannot rule out high grade. She is asymptomatic.     Past Medical History  She has a past medical history of Other conditions influencing health status.    Surgical History  She has a past surgical history that includes Dilation and curettage of uterus (2018) and  section, classic (2018).     OB History  OB History    Para Term  AB Living   3 2 2   1     SAB IAB Ectopic Multiple Live Births   1              # Outcome Date GA Lbr Nacho/2nd Weight Sex Type Anes PTL Lv   3 Term            2 Term            1 SAB                Sexual History  Social History     Substance and Sexual Activity   Sexual Activity Not on file        Social History  She reports that she quit smoking about 9 years ago. Her smoking use included cigarettes. She has never used smokeless tobacco. She reports that she does not currently use alcohol. She reports current drug use. Drug: Marijuana.    Family History  Family History   Problem Relation Name Age of Onset    Breast cancer Mother  63        stage 3    Breast cancer Maternal Grandmother      Breast cancer Paternal Grandmother          Allergies  Hydrocodone-acetaminophen, Lamotrigine, Latex, Hydrocodone, and Mirtazapine    Review of Systems   All other systems reviewed and are negative.       Physical Exam   General: A&Ox3  Head: Normocephalic, atraumatic  Heart/Lungs: RRR, No murmurs, gallops, or rubs. Lungs are CTAB.  Abdomen: Soft, nontender. BS+4. No bruising or masses.  Lower Extremities: No lower extremity Edema no palpable cords.       Last Recorded Vitals  Blood pressure (!) 114/91, pulse 81, temperature 36.7 °C (98.1 °F), temperature source Temporal, resp. rate 14, last menstrual period 2024, SpO2 100%.    Relevant Results  Medications  No current facility-administered medications for this encounter.    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 She meets criteria for expedited treatment. Will schedule for cold knife cone. RBA discussed including but not limited to: Infection, bleeding, injury to adjacent structures such as bladder and bowel, need for admission or a larger incision.       Taran Hutchins MD    "

## 2025-01-17 NOTE — ANESTHESIA PROCEDURE NOTES
Airway  Date/Time: 1/17/2025 2:29 PM  Urgency: elective    Airway not difficult    Staffing  Performed: CRNA   Authorized by: LAUREN Penny    Performed by: LAUREN Penny  Patient location during procedure: OR    Indications and Patient Condition  Indications for airway management: anesthesia  Spontaneous ventilation: present  Sedation level: deep  Preoxygenated: yes  Patient position: sniffing  Mask difficulty assessment: 1 - vent by mask    Final Airway Details  Final airway type: supraglottic airway      Successful airway: classic  Size 3     Number of attempts at approach: 1    Additional Comments  DENTITION INTACT POST LMA INSERTION

## 2025-01-31 LAB
LAB AP ASR DISCLAIMER: NORMAL
LABORATORY COMMENT REPORT: NORMAL
PATH REPORT.COMMENTS IMP SPEC: NORMAL
PATH REPORT.FINAL DX SPEC: NORMAL
PATH REPORT.GROSS SPEC: NORMAL
PATH REPORT.RELEVANT HX SPEC: NORMAL
PATH REPORT.TOTAL CANCER: NORMAL

## 2025-02-03 ENCOUNTER — APPOINTMENT (OUTPATIENT)
Dept: OBSTETRICS AND GYNECOLOGY | Facility: CLINIC | Age: 35
End: 2025-02-03
Payer: MEDICAID

## 2025-02-03 VITALS — DIASTOLIC BLOOD PRESSURE: 64 MMHG | WEIGHT: 105.6 LBS | SYSTOLIC BLOOD PRESSURE: 90 MMHG | BODY MASS INDEX: 19.31 KG/M2

## 2025-02-03 DIAGNOSIS — R87.810 ASCUS WITH POSITIVE HIGH RISK HPV CERVICAL: Primary | ICD-10-CM

## 2025-02-03 DIAGNOSIS — R87.610 ASCUS WITH POSITIVE HIGH RISK HPV CERVICAL: Primary | ICD-10-CM

## 2025-08-04 ENCOUNTER — APPOINTMENT (OUTPATIENT)
Dept: OBSTETRICS AND GYNECOLOGY | Facility: CLINIC | Age: 35
End: 2025-08-04
Payer: MEDICAID

## 2025-08-04 ENCOUNTER — OFFICE VISIT (OUTPATIENT)
Dept: OBSTETRICS AND GYNECOLOGY | Facility: CLINIC | Age: 35
End: 2025-08-04
Payer: MEDICAID

## 2025-08-04 VITALS — DIASTOLIC BLOOD PRESSURE: 68 MMHG | SYSTOLIC BLOOD PRESSURE: 100 MMHG | BODY MASS INDEX: 19.46 KG/M2 | WEIGHT: 106.4 LBS

## 2025-08-04 DIAGNOSIS — Z12.4 SCREENING FOR MALIGNANT NEOPLASM OF CERVIX: ICD-10-CM

## 2025-08-04 DIAGNOSIS — Z11.51 SCREENING FOR HUMAN PAPILLOMAVIRUS (HPV): ICD-10-CM

## 2025-08-04 DIAGNOSIS — R87.610 ASCUS WITH POSITIVE HIGH RISK HPV CERVICAL: ICD-10-CM

## 2025-08-04 DIAGNOSIS — R87.810 ASCUS WITH POSITIVE HIGH RISK HPV CERVICAL: ICD-10-CM

## 2025-08-04 DIAGNOSIS — D06.9 CIN III (CERVICAL INTRAEPITHELIAL NEOPLASIA III): Primary | ICD-10-CM

## 2025-08-04 PROCEDURE — 87626 HPV SEP HI-RSK TYP&POOL RSLT: CPT

## 2025-08-04 PROCEDURE — 99213 OFFICE O/P EST LOW 20 MIN: CPT | Performed by: STUDENT IN AN ORGANIZED HEALTH CARE EDUCATION/TRAINING PROGRAM

## 2025-08-04 NOTE — PROGRESS NOTES
"Subjective   Patient ID: Ale Tucker \"Roshni" is a 35 y.o. female who presents for gyn follow up (Follow up PAP).  Patient presents for follow up pap. No concerns today. No fevers, chills. No HA/vision changes. No RUQ pain, n/v. No chest pain or SOB. No calf pain.          Review of Systems   All other systems reviewed and are negative.      Objective   Physical Exam  General: A&Ox3  Head: Normocephalic, atraumatic  Heart/Lungs: RRR, No murmurs, gallops, or rubs. Lungs are CTAB.  Abdomen: Soft, nontender. BS+4. No bruising or masses.  Genitourinary: Labia and vagina normal in appearance. Uterus is small, mobile, anteverted. No adnexal masses palpated.  Lower Extremities: No lower extremity Edema no palpable cords.     A chaperone, Nanette Gilbert, was present for the exam.    Assessment/Plan   Problem List Items Addressed This Visit       ASCUS with positive high risk HPV cervical    Overview   S/p CKC for ASCUS with HPV 18 pos.    Follow up pap in 6 months.  Pap obtained.         EHIDI III (cervical intraepithelial neoplasia III) - Primary    Overview   Last seen for TL and LEEP September 12, 2023  --HEIDI-3: Patient with LEEP in September 2023 revealing HEIDI-3 with margins clear.            Other Visit Diagnoses         Screening for malignant neoplasm of cervix        Relevant Orders    THINPREP PAP      Screening for human papillomavirus (HPV)        Relevant Orders    THINPREP PAP                   Taran Hutchins MD 08/04/25 4:32 PM   "

## 2025-08-19 ENCOUNTER — TELEPHONE (OUTPATIENT)
Dept: OBSTETRICS AND GYNECOLOGY | Facility: CLINIC | Age: 35
End: 2025-08-19
Payer: MEDICAID

## 2025-08-19 LAB
CYTOLOGY CMNT CVX/VAG CYTO-IMP: NORMAL
HPV HR 12 DNA GENITAL QL NAA+PROBE: NEGATIVE
HPV HR GENOTYPES PNL CVX NAA+PROBE: POSITIVE
HPV16 DNA SPEC QL NAA+PROBE: NEGATIVE
HPV18 DNA SPEC QL NAA+PROBE: POSITIVE
LAB AP HPV GENOTYPE QUESTION: YES
LAB AP HPV HR: NORMAL
LAB AP PREVIOUS ABNORMAL HISTORY: NORMAL
LABORATORY COMMENT REPORT: NORMAL
LMP START DATE: NORMAL
PATH REPORT.TOTAL CANCER: NORMAL

## 2025-09-22 ENCOUNTER — APPOINTMENT (OUTPATIENT)
Dept: OBSTETRICS AND GYNECOLOGY | Facility: CLINIC | Age: 35
End: 2025-09-22
Payer: MEDICAID

## (undated) DEVICE — SYRINGE, 10 CC, LUER LOCK

## (undated) DEVICE — Device

## (undated) DEVICE — PREP TRAY, SKIN, DRY, W/GLOVES

## (undated) DEVICE — PAD, GROUNDING, ELECTROSURGICAL, W/9 FT CABLE, POLYHESIVE II, ADULT, LF

## (undated) DEVICE — TOWEL PACK, STERILE, 4/PACK, BLUE

## (undated) DEVICE — SOLUTION, IRRIGATION, SODIUM CHLORIDE 0.9%, 1000 ML, POUR BOTTLE

## (undated) DEVICE — TUBING, SUCTION, NON-CONDUCTIVE,W/MALE CONNECT, 6MM X 12 FT, STERILE

## (undated) DEVICE — PREP, SCRUB, SKIN, FOAM, HIBICLENS, 4 OZ

## (undated) DEVICE — TIP, SUCTION, YANKAUER, BULB, OPEN TIP, W/O CONTROL VENT, LF, CLEAR

## (undated) DEVICE — PAD, SANITARY, OBSTETRICAL, W/ADHSV STRIP,11 IN,LF

## (undated) DEVICE — COVER HANDLE LIGHT, STERIS, BLUE, STERILE

## (undated) DEVICE — ELECTRODE BALL, 5MM

## (undated) DEVICE — LUBRICANT, JELLY, STERILE, 4OZ, FLIP TOP

## (undated) DEVICE — NEEDLE, SPINAL, 22 G X 3.5 IN, BLACK HUB

## (undated) DEVICE — DRAPE, UNDERBUTTOCKS, W/ 27IN FLUID POUCH

## (undated) DEVICE — CONTAINER, SPECIMEN, 4 OZ, OR PEEL PACK, STERILE